# Patient Record
Sex: FEMALE | Race: WHITE | URBAN - METROPOLITAN AREA
[De-identification: names, ages, dates, MRNs, and addresses within clinical notes are randomized per-mention and may not be internally consistent; named-entity substitution may affect disease eponyms.]

---

## 2017-03-24 ENCOUNTER — APPOINTMENT (OUTPATIENT)
Dept: URBAN - METROPOLITAN AREA CLINIC 292 | Age: 42
Setting detail: DERMATOLOGY
End: 2017-03-24

## 2017-03-24 DIAGNOSIS — L85.3 XEROSIS CUTIS: ICD-10-CM

## 2017-03-24 DIAGNOSIS — L81.0 POSTINFLAMMATORY HYPERPIGMENTATION: ICD-10-CM

## 2017-03-24 DIAGNOSIS — K11.6 MUCOCELE OF SALIVARY GLAND: ICD-10-CM

## 2017-03-24 PROBLEM — D48.5 NEOPLASM OF UNCERTAIN BEHAVIOR OF SKIN: Status: ACTIVE | Noted: 2017-03-24

## 2017-03-24 PROCEDURE — 99203 OFFICE O/P NEW LOW 30 MIN: CPT | Mod: 25

## 2017-03-24 PROCEDURE — 41100 BIOPSY OF TONGUE: CPT

## 2017-03-24 PROCEDURE — OTHER BIOPSY BY SHAVE METHOD: OTHER

## 2017-03-24 PROCEDURE — OTHER COUNSELING: OTHER

## 2017-03-24 ASSESSMENT — LOCATION SIMPLE DESCRIPTION DERM
LOCATION SIMPLE: ANTERIOR TONGUE
LOCATION SIMPLE: ANTERIOR TONGUE

## 2017-03-24 ASSESSMENT — LOCATION ZONE DERM
LOCATION ZONE: MUCOUS_MEMBRANE
LOCATION ZONE: MUCOUS_MEMBRANE

## 2017-03-24 ASSESSMENT — LOCATION DETAILED DESCRIPTION DERM
LOCATION DETAILED: LEFT ANTERIOR TONGUE
LOCATION DETAILED: LEFT ANTERIOR TONGUE

## 2017-03-24 NOTE — PROCEDURE: BIOPSY BY SHAVE METHOD
Size Of Lesion In Cm: 0
Electrodesiccation Text: The wound bed was treated with electrodesiccation after the biopsy was performed.
Dressing: bandage
Cryotherapy Text: The wound bed was treated with cryotherapy after the biopsy was performed.
Biopsy Type: H and E
Electrodesiccation And Curettage Text: The wound bed was treated with electrodesiccation and curettage after the biopsy was performed.
Anesthesia Volume In Cc (Will Not Render If 0): 0.5
Silver Nitrate Text: The wound bed was treated with silver nitrate after the biopsy was performed.
Detail Level: Detailed
Type Of Destruction Used: Curettage
Biopsy Method: Dermablade
Billing Type: Third-Party Bill
Additional Anesthesia Volume In Cc (Will Not Render If 0): 6
Bill [id number] For Specimen Handling/Conveyance To Laboratory?: no
Wound Care: Bacitracin
Hemostasis: Drysol
Curettage Text: The wound bed was treated with curettage after the biopsy was performed.
Anesthesia Type: 1% lidocaine with epinephrine